# Patient Record
Sex: FEMALE | Race: WHITE | HISPANIC OR LATINO | URBAN - METROPOLITAN AREA
[De-identification: names, ages, dates, MRNs, and addresses within clinical notes are randomized per-mention and may not be internally consistent; named-entity substitution may affect disease eponyms.]

---

## 2020-09-09 ENCOUNTER — APPOINTMENT (RX ONLY)
Dept: URBAN - METROPOLITAN AREA CLINIC 114 | Facility: CLINIC | Age: 30
Setting detail: DERMATOLOGY
End: 2020-09-09

## 2020-09-09 VITALS — HEIGHT: 63 IN | WEIGHT: 187 LBS

## 2020-09-09 DIAGNOSIS — L30.9 DERMATITIS, UNSPECIFIED: ICD-10-CM

## 2020-09-09 DIAGNOSIS — O26.86 PRURITIC URTICARIAL PAPULES AND PLAQUES OF PREGNANCY (PUPPP): ICD-10-CM

## 2020-09-09 PROCEDURE — ? BIOPSY BY SHAVE METHOD

## 2020-09-09 PROCEDURE — ? TREATMENT REGIMEN

## 2020-09-09 PROCEDURE — 11102 TANGNTL BX SKIN SINGLE LES: CPT

## 2020-09-09 PROCEDURE — ? COUNSELING

## 2020-09-09 PROCEDURE — 99202 OFFICE O/P NEW SF 15 MIN: CPT | Mod: 25

## 2020-09-09 PROCEDURE — ? PRESCRIPTION

## 2020-09-09 RX ORDER — TRIAMCINOLONE ACETONIDE 1 MG/G
CREAM TOPICAL BID
Qty: 1 | Refills: 3 | Status: ERX | COMMUNITY
Start: 2020-09-09

## 2020-09-09 RX ADMIN — TRIAMCINOLONE ACETONIDE: 1 CREAM TOPICAL at 00:00

## 2020-09-09 ASSESSMENT — LOCATION DETAILED DESCRIPTION DERM
LOCATION DETAILED: LEFT VENTRAL PROXIMAL FOREARM
LOCATION DETAILED: RIGHT VENTRAL DISTAL FOREARM
LOCATION DETAILED: SUPERIOR LUMBAR SPINE
LOCATION DETAILED: RIGHT RADIAL DORSAL HAND
LOCATION DETAILED: RIGHT LATERAL ANTECUBITAL SKIN
LOCATION DETAILED: LEFT RADIAL DORSAL HAND

## 2020-09-09 ASSESSMENT — LOCATION SIMPLE DESCRIPTION DERM
LOCATION SIMPLE: LEFT HAND
LOCATION SIMPLE: RIGHT HAND
LOCATION SIMPLE: RIGHT ARM
LOCATION SIMPLE: LEFT FOREARM
LOCATION SIMPLE: RIGHT FOREARM
LOCATION SIMPLE: LOWER BACK

## 2020-09-09 ASSESSMENT — LOCATION ZONE DERM
LOCATION ZONE: HAND
LOCATION ZONE: ARM
LOCATION ZONE: TRUNK

## 2020-09-09 ASSESSMENT — SEVERITY ASSESSMENT: SEVERITY: MODERATE

## 2020-09-09 NOTE — PROCEDURE: BIOPSY BY SHAVE METHOD
Depth Of Biopsy: dermis
Hide Biopsy Depth?: No
X Size Of Lesion In Cm: 0
Electrodesiccation And Curettage Text: The wound bed was treated with electrodesiccation and curettage after the biopsy was performed.
Post-Care Instructions: I reviewed with the patient in detail post-care instructions: If your area was covered with a Band-Aid, remove with your next bath and gently clean daily with soap and water.  Do not use hydrogen peroxide. Apply a thin later of Aquaphor/Vaseline and cover with a Band-Aid.  The goal is to keep the wound moist to prevent a scab. It is normal to have a little redness 1/8 inch around the area; however, if increased redness, pain, swelling or discolored drainage occurs, call the office.  If bleeding occurs, hold steady pressure for 10-15 minutes to stop it.  Our office normally receives your results in 7-10 business days and will call or email you when we receive them.  If you do not hear from us in two weeks, please call us.
Information: Selecting Yes will display possible errors in your note based on the variables you have selected. This validation is only offered as a suggestion for you. PLEASE NOTE THAT THE VALIDATION TEXT WILL BE REMOVED WHEN YOU FINALIZE YOUR NOTE. IF YOU WANT TO FAX A PRELIMINARY NOTE YOU WILL NEED TO TOGGLE THIS TO 'NO' IF YOU DO NOT WANT IT IN YOUR FAXED NOTE.
Anesthesia Volume In Cc: 1.5
Render Post-Care Instructions In Note?: yes
Billing Type: Third-Party Bill
Dressing: Band-Aid
Biopsy Method: Personna blade
Silver Nitrate Text: The wound bed was treated with silver nitrate after the biopsy was performed.
Type Of Destruction Used: Electrodesiccation and Curettage
Size Of Lesion In Cm: 0.5
Wound Care: Polysporin ointment
Biopsy Type: H and E
Detail Level: Detailed
Electrodesiccation Text: The wound bed was treated with electrodesiccation after the biopsy was performed.
Hemostasis: Aluminum Chloride
Consent: Written consent was obtained and risks were reviewed including but not limited to scarring, infection, bleeding, scabbing, incomplete removal, nerve damage and allergy to anesthesia.
Notification Instructions: Patient will be notified of biopsy results. However, patient instructed to call the office if not contacted within 2 weeks.